# Patient Record
Sex: MALE | Race: WHITE | Employment: FULL TIME | URBAN - METROPOLITAN AREA
[De-identification: names, ages, dates, MRNs, and addresses within clinical notes are randomized per-mention and may not be internally consistent; named-entity substitution may affect disease eponyms.]

---

## 2017-06-03 ENCOUNTER — LAB ENCOUNTER (OUTPATIENT)
Dept: LAB | Age: 37
End: 2017-06-03
Attending: UROLOGY
Payer: COMMERCIAL

## 2017-06-03 DIAGNOSIS — Z00.00 ROUTINE GENERAL MEDICAL EXAMINATION AT A HEALTH CARE FACILITY: Primary | ICD-10-CM

## 2017-06-03 PROCEDURE — 88237 TISSUE CULTURE BONE MARROW: CPT

## 2017-06-03 PROCEDURE — 88262 CHROMOSOME ANALYSIS 15-20: CPT

## 2020-11-17 ENCOUNTER — HOSPITAL ENCOUNTER (OUTPATIENT)
Age: 40
Discharge: HOME | End: 2020-11-17
Payer: COMMERCIAL

## 2020-11-17 VITALS
TEMPERATURE: 99 F | SYSTOLIC BLOOD PRESSURE: 143 MMHG | HEART RATE: 71 BPM | RESPIRATION RATE: 18 BRPM | OXYGEN SATURATION: 96 % | DIASTOLIC BLOOD PRESSURE: 90 MMHG

## 2020-11-17 DIAGNOSIS — Z20.822 SUSPECTED COVID-19 VIRUS INFECTION: Primary | ICD-10-CM

## 2020-11-17 DIAGNOSIS — R51.9 ACUTE NONINTRACTABLE HEADACHE, UNSPECIFIED HEADACHE TYPE: ICD-10-CM

## 2020-11-17 DIAGNOSIS — Z20.822 EXPOSURE TO COVID-19 VIRUS: ICD-10-CM

## 2020-11-17 PROCEDURE — 99202 OFFICE O/P NEW SF 15 MIN: CPT | Performed by: PHYSICIAN ASSISTANT

## 2020-11-17 NOTE — ED PROVIDER NOTES
Patient Seen in: [facility]      History   Patient presents with:  Headache: Along with other symptoms - Entered by patient  Testing    Stated Complaint: Headache - Along with other symptoms    HPI    81-year-old male who comes in to Labs Reviewed   SARS-COV-2 RNA,QUAL RT-PCR ([facility])             MDM       I discussed with the patient quarantine instructions, COVID-19 instructions and conservative care.  Patient will remain self quarantined until results are received and is aware of

## 2023-11-22 ENCOUNTER — HOSPITAL ENCOUNTER (OUTPATIENT)
Age: 43
Discharge: HOME | End: 2023-11-22
Payer: COMMERCIAL

## 2023-11-22 VITALS
HEIGHT: 72 IN | TEMPERATURE: 98 F | WEIGHT: 205 LBS | OXYGEN SATURATION: 99 % | BODY MASS INDEX: 27.77 KG/M2 | HEART RATE: 66 BPM | SYSTOLIC BLOOD PRESSURE: 124 MMHG | RESPIRATION RATE: 18 BRPM | DIASTOLIC BLOOD PRESSURE: 79 MMHG

## 2023-11-22 DIAGNOSIS — J02.0 STREPTOCOCCAL SORE THROAT: Primary | ICD-10-CM

## 2023-11-22 LAB — S PYO AG THROAT QL: POSITIVE

## 2023-11-22 PROCEDURE — 87880 STREP A ASSAY W/OPTIC: CPT | Performed by: NURSE PRACTITIONER

## 2023-11-22 PROCEDURE — 99203 OFFICE O/P NEW LOW 30 MIN: CPT | Performed by: NURSE PRACTITIONER

## 2023-11-22 RX ORDER — PENICILLIN V POTASSIUM 500 MG/1
500 TABLET ORAL 2 TIMES DAILY
Qty: 20 TABLET | Refills: 0 | Status: SHIPPED | OUTPATIENT
Start: 2023-11-22 | End: 2023-12-02

## 2023-11-22 NOTE — DISCHARGE INSTRUCTIONS
Take antibiotics as directed and to completion  Wash hands often  Disinfect your environment  Drink plenty of fluids  Get plenty of rest  Do not share utensils or drinks  Change toothbrush tomorrow evening  Salt water gargles throughout the day  Alternate Ibuprofen and Tylenol as needed for pain / fever  Follow up with your primary care provider as needed

## 2023-11-22 NOTE — ED INITIAL ASSESSMENT (HPI)
Patient reports chills starting Sunday, progressed to sore throat, bodyaches, fever, fatigue, dry cough. Fever max 101. Last tylenol at 730 am. Denies shortness of breath.

## 2024-01-05 ENCOUNTER — HOSPITAL ENCOUNTER (OUTPATIENT)
Age: 44
Discharge: HOME | End: 2024-01-05
Payer: COMMERCIAL

## 2024-01-05 VITALS
DIASTOLIC BLOOD PRESSURE: 79 MMHG | HEART RATE: 86 BPM | OXYGEN SATURATION: 98 % | RESPIRATION RATE: 20 BRPM | SYSTOLIC BLOOD PRESSURE: 131 MMHG | TEMPERATURE: 98 F

## 2024-01-05 DIAGNOSIS — U07.1 COVID-19: ICD-10-CM

## 2024-01-05 DIAGNOSIS — R05.1 ACUTE COUGH: Primary | ICD-10-CM

## 2024-01-05 LAB
POCT INFLUENZA A: NEGATIVE
POCT INFLUENZA B: NEGATIVE
SARS-COV-2 RNA RESP QL NAA+PROBE: DETECTED

## 2024-01-05 NOTE — ED INITIAL ASSESSMENT (HPI)
Patient states sinus pressure and congestion with post nasal drip for 4 days  Non productive cough  Body aches and chills

## 2024-01-05 NOTE — ED PROVIDER NOTES
Patient Seen in: [facility]      History     Chief Complaint   Patient presents with    Cough     Stated Complaint: congestion /cough x 4 days    Subjective:   43-year-old male presents to immediate care for 4 days of cough and congestion.  Patient states that he has not had any fever but does feel some bodyaches and mild chills.  Denies any shortness of breath denies chest pain.            Objective:   History reviewed. No pertinent past medical history.           History reviewed. No pertinent surgical history.             Social History     Socioeconomic History    Marital status:    Tobacco Use    Smoking status: Never     Passive exposure: Never    Smokeless tobacco: Never   Vaping Use    Vaping Use: Never used   Substance and Sexual Activity    Alcohol use: Yes     Comment: weekends only    Drug use: Not Currently              Review of Systems   Constitutional: Negative.    Respiratory: Negative.     Cardiovascular: Negative.    Gastrointestinal: Negative.    Skin: Negative.    Neurological: Negative.        Positive for stated complaint: congestion /cough x 4 days  Other systems are as noted in HPI.  Constitutional and vital signs reviewed.      All other systems reviewed and negative except as noted above.    Physical Exam     ED Triage Vitals [01/05/24 1345]   /79   Pulse 86   Resp 20   Temp 97.7 °F (36.5 °C)   Temp src Temporal   SpO2 98 %   O2 Device None (Room air)       Current:/79   Pulse 86   Temp 97.7 °F (36.5 °C) (Temporal)   Resp 20   SpO2 98%         Physical Exam  Vitals and nursing note reviewed.   Constitutional:       General: He is not in acute distress.  HENT:      Head: Normocephalic.   Cardiovascular:      Rate and Rhythm: Normal rate.   Pulmonary:      Effort: Pulmonary effort is normal.   Musculoskeletal:         General: Normal range of motion.   Skin:     General: Skin is warm and dry.   Neurological:      General: No focal deficit  present.      Mental Status: He is alert and oriented to person, place, and time.               ED Course     Labs Reviewed   RAPID SARS-COV-2 BY PCR - Abnormal; Notable for the following components:       Result Value    Rapid SARS-CoV-2 by PCR Detected (*)     All other components within normal limits   POCT FLU TEST - Normal    Narrative:     This assay is a rapid molecular in vitro test utilizing nucleic acid amplification of influenza A and B viral RNA.                      MDM      Medical Decision Making  Pertinent Labs & Imaging studies reviewed. (See chart for details).  Patient coming in with cough congestion.   Differential diagnosis includes COVID 19, flu  Labs reviewed COVID is positive.  Will treat for COVID-19.  Will discharge on supportive care. Patient is comfortable with this plan.     Overall Pt looks good. Non-toxic, well-hydrated and in no respiratory distress. Vital signs are reassuring. Exam is reassuring. I do not believe pt requires and additional diagnostic studies or intervention. I believe pt can be discharged home to continue evaluation as an outpatient. Follow-up provider given. Discharge instructions given and reviewed. Return for any problems. All understand and agreewith the plan.     Please note that this report has been produced using speech recognition software and may contain errors related to that system including, but not limited to, errors in grammar, punctuation, and spelling, as well as words and phrases that possibly may have been recognized inappropriately. If there are any questions or concerns, contact the dictating provider for clarification.       Problems Addressed:  Acute cough: acute illness or injury  COVID-19: acute illness or injury        Disposition and Plan     Clinical Impression:  1. Acute cough    2. COVID-19         Disposition:  Discharge  1/5/2024  2:55 pm    Follow-up:  Michael Ignacio MD  [address] [place] [address]  [id number]  [phone]                Medications Prescribed:  Current Discharge Medication List

## 2024-01-05 NOTE — DISCHARGE INSTRUCTIONS
Clear liquids, increased fluid intake  Fever control or pain, you may use ibuprofen, or acetaminophen as directed    Monitor pulse ox throughout the day, 93% or greater, if you are less than 93% call your primary care physician or go to the emergency department  Return for respiratory distress, productive cough, vomiting, or any other changes in your condition    Rest    CDC quarantine recommendations   Stay home for 5 days.  If you have no symptoms or your symptoms are resolving after 5 days, you can leave your house.  Continue to wear a mask around others for 5 additional days.  If you have a fever, continue to stay home until your fever resolves.

## 2024-01-20 ENCOUNTER — HOSPITAL ENCOUNTER (OUTPATIENT)
Age: 44
Discharge: HOME | End: 2024-01-20
Payer: COMMERCIAL

## 2024-01-20 VITALS
TEMPERATURE: 98 F | DIASTOLIC BLOOD PRESSURE: 86 MMHG | RESPIRATION RATE: 18 BRPM | SYSTOLIC BLOOD PRESSURE: 126 MMHG | BODY MASS INDEX: 27.36 KG/M2 | HEIGHT: 72 IN | OXYGEN SATURATION: 96 % | HEART RATE: 69 BPM | WEIGHT: 202 LBS

## 2024-01-20 DIAGNOSIS — R09.81 NASAL CONGESTION: ICD-10-CM

## 2024-01-20 DIAGNOSIS — J01.00 ACUTE MAXILLARY SINUSITIS, RECURRENCE NOT SPECIFIED: Primary | ICD-10-CM

## 2024-01-20 PROCEDURE — 99213 OFFICE O/P EST LOW 20 MIN: CPT | Performed by: PHYSICIAN ASSISTANT

## 2024-01-20 RX ORDER — PREDNISONE 20 MG/1
40 TABLET ORAL DAILY
Qty: 10 TABLET | Refills: 0 | Status: SHIPPED | OUTPATIENT
Start: 2024-01-20 | End: 2024-01-25

## 2024-01-20 RX ORDER — AMOXICILLIN AND CLAVULANATE POTASSIUM 875; 125 MG/1; MG/1
1 TABLET, FILM COATED ORAL 2 TIMES DAILY
Qty: 14 TABLET | Refills: 0 | Status: SHIPPED | OUTPATIENT
Start: 2024-01-20 | End: 2024-01-27

## 2024-01-20 NOTE — ED INITIAL ASSESSMENT (HPI)
Pt has had strep and covid in the past 2 months, and his nasal congestion and cough have still not gone away.  Pt had covid 1/3

## 2024-01-20 NOTE — ED PROVIDER NOTES
Patient Seen in: [facility]      History     Chief Complaint   Patient presents with    Cough/URI     Stated Complaint: Congestion    Subjective:   HPI    44 YO male presents to immediate care for evaluation of sinus pressure/pain and nasal congestion starting in November.  Patient diagnosed with COVID in December.  Most COVID symptoms resolved but nasal congestion and sinus pressure persisted. Denies fever/chills or any other physical complaints at this time.      Objective:   History reviewed. No pertinent past medical history.           History reviewed. No pertinent surgical history.             Social History     Socioeconomic History    Marital status:    Tobacco Use    Smoking status: Never     Passive exposure: Never    Smokeless tobacco: Never   Vaping Use    Vaping Use: Never used   Substance and Sexual Activity    Alcohol use: Yes     Comment: weekends only    Drug use: Not Currently              Review of Systems    Positive for stated complaint: Congestion  Other systems are as noted in HPI.  Constitutional and vital signs reviewed.      All other systems reviewed and negative except as noted above.    Physical Exam     ED Triage Vitals [01/20/24 0926]   /86   Pulse 69   Resp 18   Temp 97.7 °F (36.5 °C)   Temp src Temporal   SpO2 96 %   O2 Device None (Room air)       Current:/86   Pulse 69   Temp 97.7 °F (36.5 °C) (Temporal)   Resp 18   Ht 182.9 cm (6')   Wt 91.6 kg   SpO2 96%   BMI 27.40 kg/m²         Physical Exam  Vitals and nursing note reviewed.   Constitutional:       General: He is not in acute distress.     Appearance: Normal appearance. He is not ill-appearing, toxic-appearing or diaphoretic.   HENT:      Right Ear: Tympanic membrane and ear canal normal.      Left Ear: Tympanic membrane and ear canal normal.      Nose: Congestion present. No rhinorrhea.      Right Sinus: Maxillary sinus tenderness present. No frontal sinus tenderness.      Left  Sinus: Maxillary sinus tenderness present. No frontal sinus tenderness.      Mouth/Throat:      Mouth: Mucous membranes are moist.      Pharynx: Oropharynx is clear.   Cardiovascular:      Rate and Rhythm: Normal rate.      Heart sounds: Normal heart sounds.   Pulmonary:      Effort: Pulmonary effort is normal. No respiratory distress.      Breath sounds: Normal breath sounds. No wheezing.   Neurological:      Mental Status: He is alert and oriented to person, place, and time.   Psychiatric:         Mood and Affect: Mood normal.         Behavior: Behavior normal.              ED Course   Labs Reviewed - No data to display         MDM      Well-appearing 42 YO male presents to immediate care for evaluation of sinus pressure/pain and nasal congestion starting in November.  Patient diagnosed with COVID in December.  Most COVID symptoms resolved but nasal congestion and sinus pressure persisted.     Nasal congestion and bilateral maxillary sinus tenderness on exam.  Physical exam is otherwise unremarkable.  Considering duration of sinusitis symptoms, will prescribe Augmentin to cover possible bacterial component.  Prednisone also prescribed for sinusitis.  Return instructions discussed with patient understanding.      Medical Decision Making  Risk  Prescription drug management.        Disposition and Plan     Clinical Impression:  1. Acute maxillary sinusitis, recurrence not specified    2. Nasal congestion         Disposition:  Discharge  1/20/2024  9:43 am    Follow-up:  [facility] [address] [id number] [phone]    If symptoms worsen          Medications Prescribed:  Current Discharge Medication List        START taking these medications    Details   amoxicillin clavulanate 875-125 MG Oral Tab Take 1 tablet by mouth 2 (two) times daily for 7 days.  Qty: 14 tablet, Refills: 0      predniSONE 20 MG Oral Tab Take 2 tablets (40 mg total) by mouth daily for 5 days.  Qty: 10  tablet, Refills: 0